# Patient Record
Sex: FEMALE | Race: BLACK OR AFRICAN AMERICAN | NOT HISPANIC OR LATINO | Employment: UNEMPLOYED | URBAN - METROPOLITAN AREA
[De-identification: names, ages, dates, MRNs, and addresses within clinical notes are randomized per-mention and may not be internally consistent; named-entity substitution may affect disease eponyms.]

---

## 2023-03-28 ENCOUNTER — HOSPITAL ENCOUNTER (OUTPATIENT)
Facility: HOSPITAL | Age: 43
Discharge: HOME | End: 2023-03-28
Attending: OBSTETRICS & GYNECOLOGY | Admitting: OBSTETRICS & GYNECOLOGY
Payer: MEDICAID

## 2023-03-28 VITALS
HEART RATE: 79 BPM | SYSTOLIC BLOOD PRESSURE: 110 MMHG | RESPIRATION RATE: 16 BRPM | OXYGEN SATURATION: 99 % | DIASTOLIC BLOOD PRESSURE: 76 MMHG | TEMPERATURE: 104 F

## 2023-03-28 PROBLEM — Z34.90 PREGNANCY: Status: ACTIVE | Noted: 2023-03-28

## 2023-03-28 LAB
ALBUMIN SERPL-MCNC: 3.3 G/DL (ref 3.5–5.2)
ALBUMIN/GLOB SERPL: 1.1 G/DL
ALP SERPL-CCNC: 66 U/L (ref 39–117)
ALT SERPL W P-5'-P-CCNC: 12 U/L (ref 1–33)
ANION GAP SERPL CALCULATED.3IONS-SCNC: 10 MMOL/L (ref 5–15)
AST SERPL-CCNC: 18 U/L (ref 1–32)
BACTERIA UR QL AUTO: ABNORMAL /HPF
BILIRUB SERPL-MCNC: 0.2 MG/DL (ref 0–1.2)
BILIRUB UR QL STRIP: NEGATIVE
BUN SERPL-MCNC: 8 MG/DL (ref 6–20)
BUN/CREAT SERPL: 15.4 (ref 7–25)
CALCIUM SPEC-SCNC: 9.1 MG/DL (ref 8.6–10.5)
CHLORIDE SERPL-SCNC: 104 MMOL/L (ref 98–107)
CLARITY UR: ABNORMAL
CO2 SERPL-SCNC: 21 MMOL/L (ref 22–29)
COLOR UR: YELLOW
CREAT SERPL-MCNC: 0.52 MG/DL (ref 0.57–1)
DEPRECATED RDW RBC AUTO: 39.3 FL (ref 37–54)
EGFRCR SERPLBLD CKD-EPI 2021: 119.9 ML/MIN/1.73
ERYTHROCYTE [DISTWIDTH] IN BLOOD BY AUTOMATED COUNT: 12.3 % (ref 12.3–15.4)
GLOBULIN UR ELPH-MCNC: 3.1 GM/DL
GLUCOSE SERPL-MCNC: 82 MG/DL (ref 65–99)
GLUCOSE UR STRIP-MCNC: NEGATIVE MG/DL
HCT VFR BLD AUTO: 32.5 % (ref 34–46.6)
HGB BLD-MCNC: 10.5 G/DL (ref 12–15.9)
HGB UR QL STRIP.AUTO: NEGATIVE
HYALINE CASTS UR QL AUTO: ABNORMAL /LPF
KETONES UR QL STRIP: NEGATIVE
LEUKOCYTE ESTERASE UR QL STRIP.AUTO: ABNORMAL
LIPASE SERPL-CCNC: 39 U/L (ref 13–60)
MCH RBC QN AUTO: 27.9 PG (ref 26.6–33)
MCHC RBC AUTO-ENTMCNC: 32.3 G/DL (ref 31.5–35.7)
MCV RBC AUTO: 86.2 FL (ref 79–97)
NITRITE UR QL STRIP: NEGATIVE
PH UR STRIP.AUTO: 6.5 [PH] (ref 5–8)
PLATELET # BLD AUTO: 332 10*3/MM3 (ref 140–450)
PMV BLD AUTO: 9.6 FL (ref 6–12)
POTASSIUM SERPL-SCNC: 3.8 MMOL/L (ref 3.5–5.2)
PROT SERPL-MCNC: 6.4 G/DL (ref 6–8.5)
PROT UR QL STRIP: ABNORMAL
RBC # BLD AUTO: 3.77 10*6/MM3 (ref 3.77–5.28)
RBC # UR STRIP: ABNORMAL /HPF
REF LAB TEST METHOD: ABNORMAL
SODIUM SERPL-SCNC: 135 MMOL/L (ref 136–145)
SP GR UR STRIP: 1.02 (ref 1–1.03)
SQUAMOUS #/AREA URNS HPF: ABNORMAL /HPF
UROBILINOGEN UR QL STRIP: ABNORMAL
WBC # UR STRIP: ABNORMAL /HPF
WBC NRBC COR # BLD: 10.94 10*3/MM3 (ref 3.4–10.8)

## 2023-03-28 PROCEDURE — 81001 URINALYSIS AUTO W/SCOPE: CPT | Performed by: OBSTETRICS & GYNECOLOGY

## 2023-03-28 PROCEDURE — 87147 CULTURE TYPE IMMUNOLOGIC: CPT | Performed by: OBSTETRICS & GYNECOLOGY

## 2023-03-28 PROCEDURE — G0463 HOSPITAL OUTPT CLINIC VISIT: HCPCS

## 2023-03-28 PROCEDURE — 83690 ASSAY OF LIPASE: CPT | Performed by: OBSTETRICS & GYNECOLOGY

## 2023-03-28 PROCEDURE — 85027 COMPLETE CBC AUTOMATED: CPT | Performed by: OBSTETRICS & GYNECOLOGY

## 2023-03-28 PROCEDURE — 87186 SC STD MICRODIL/AGAR DIL: CPT | Performed by: OBSTETRICS & GYNECOLOGY

## 2023-03-28 PROCEDURE — 96374 THER/PROPH/DIAG INJ IV PUSH: CPT

## 2023-03-28 PROCEDURE — 80053 COMPREHEN METABOLIC PANEL: CPT | Performed by: OBSTETRICS & GYNECOLOGY

## 2023-03-28 PROCEDURE — 59025 FETAL NON-STRESS TEST: CPT

## 2023-03-28 PROCEDURE — 25010000002 ONDANSETRON PER 1 MG: Performed by: OBSTETRICS & GYNECOLOGY

## 2023-03-28 PROCEDURE — 87086 URINE CULTURE/COLONY COUNT: CPT | Performed by: OBSTETRICS & GYNECOLOGY

## 2023-03-28 PROCEDURE — G0378 HOSPITAL OBSERVATION PER HR: HCPCS

## 2023-03-28 PROCEDURE — 96361 HYDRATE IV INFUSION ADD-ON: CPT

## 2023-03-28 RX ORDER — PANTOPRAZOLE SODIUM 40 MG/10ML
40 INJECTION, POWDER, LYOPHILIZED, FOR SOLUTION INTRAVENOUS
Status: DISCONTINUED | OUTPATIENT
Start: 2023-03-29 | End: 2023-03-28 | Stop reason: HOSPADM

## 2023-03-28 RX ORDER — SODIUM CHLORIDE 9 MG/ML
40 INJECTION, SOLUTION INTRAVENOUS AS NEEDED
Status: DISCONTINUED | OUTPATIENT
Start: 2023-03-28 | End: 2023-03-28 | Stop reason: HOSPADM

## 2023-03-28 RX ORDER — PNV NO.95/FERROUS FUM/FOLIC AC 28MG-0.8MG
1 TABLET ORAL DAILY
Qty: 100 TABLET | Refills: 3 | Status: SHIPPED | OUTPATIENT
Start: 2023-03-28

## 2023-03-28 RX ORDER — ONDANSETRON 4 MG/1
4 TABLET, ORALLY DISINTEGRATING ORAL EVERY 8 HOURS PRN
Qty: 10 TABLET | Refills: 0 | Status: SHIPPED | OUTPATIENT
Start: 2023-03-28

## 2023-03-28 RX ORDER — SODIUM CHLORIDE 0.9 % (FLUSH) 0.9 %
10 SYRINGE (ML) INJECTION EVERY 12 HOURS SCHEDULED
Status: DISCONTINUED | OUTPATIENT
Start: 2023-03-28 | End: 2023-03-28 | Stop reason: HOSPADM

## 2023-03-28 RX ORDER — SODIUM CHLORIDE 0.9 % (FLUSH) 0.9 %
1-10 SYRINGE (ML) INJECTION AS NEEDED
Status: DISCONTINUED | OUTPATIENT
Start: 2023-03-28 | End: 2023-03-28 | Stop reason: HOSPADM

## 2023-03-28 RX ORDER — ONDANSETRON 2 MG/ML
4 INJECTION INTRAMUSCULAR; INTRAVENOUS EVERY 6 HOURS PRN
Status: DISCONTINUED | OUTPATIENT
Start: 2023-03-28 | End: 2023-03-28 | Stop reason: HOSPADM

## 2023-03-28 RX ADMIN — ASCORBIC ACID, VITAMIN A PALMITATE, CHOLECALCIFEROL, THIAMINE HYDROCHLORIDE, RIBOFLAVIN-5 PHOSPHATE SODIUM, PYRIDOXINE HYDROCHLORIDE, NIACINAMIDE, DEXPANTHENOL, ALPHA-TOCOPHEROL ACETATE, VITAMIN K1, FOLIC ACID, BIOTIN, CYANOCOBALAMIN: 200; 3300; 200; 6; 3.6; 6; 40; 15; 10; 150; 600; 60; 5 INJECTION, SOLUTION INTRAVENOUS at 18:50

## 2023-03-28 RX ADMIN — SODIUM CHLORIDE, POTASSIUM CHLORIDE, SODIUM LACTATE AND CALCIUM CHLORIDE 1000 ML: 600; 310; 30; 20 INJECTION, SOLUTION INTRAVENOUS at 17:30

## 2023-03-28 RX ADMIN — ONDANSETRON 4 MG: 2 INJECTION INTRAMUSCULAR; INTRAVENOUS at 17:58

## 2023-03-28 NOTE — PROGRESS NOTES
Laborist    Patient seen and examined.  Patient tolerated clear liquid diet has had no further nausea vomiting since admission.  Vital signs stable afebrile  Fetal heart rate reactive    CMP essentially within normal limits, lipase 39, CBC WBC 10.9 H&H 10 and 25.5    IMP 1.  IUP  28 weeks 5 days just         2.  Nausea and vomiting resolved         3.  Dehydration corrected  PLAN        1.  Discharged home, Rx Zofran as needed, daily multivitamin, follow-up with [facility] until care established at [facility].  All questions answered through  patient agreed.

## 2023-03-28 NOTE — H&P
[facility]  Obstetric History and Physical    Referring Provider: Evette Belcher MD      CC: Nausea and vomiting.    Subjective     Patient is a 43 y.o. female  currently at 28w5d, who presents with nausea and vomiting.  Patient reports nausea and vomiting for 3 days.  Patient denies fever, leaking of fluid, vaginal bleeding, recent trauma, regular urine activity, or decreased fetal movement.  Patient is of  descent and speaks Swahili.   care at [facility] clinic.  Records reviewed by  resident.  And conveyed.  Normal anomaly scan and normal NIPS.   course complicated by recurrent nausea and vomiting.  She reports has scheduled appointment with one of her [facility] physicians April 15.  Obstetrical history significant for 5 term vaginal deliveries in the [facility] without complications per patient.  All history and information was obtained through online .     .    The following portions of the patients history were reviewed and updated as appropriate: current medications, allergies, past medical history, past surgical history, past family history, past social history and problem list .       Prenatal Information:   Maternal Prenatal Labs  Blood Type No results found for: ABO   Rh Status No results found for: RH   Antibody Screen No results found for: ABSCRN   Gonnorhea No results found for: GCCX   Chlamydia No results found for: CLAMYDCU   RPR No results found for: RPR   Syphilis Antibody No results found for: SYPHILIS   Rubella No results found for: RUBELLAIGGIN   Hepatitis B Surface Antigen No results found for: HEPBSAG   HIV-1 Antibody No results found for: LABHIV1   Hepatitis C Antibody No results found for: HEPCAB   Rapid Urin Drug Screen No results found for: AMPMETHU, BARBITSCNUR, LABBENZSCN, LABMETHSCN, LABOPIASCN, THCURSCR, COCAINEUR, AMPHETSCREEN, PROPOXSCN, BUPRENORSCNU, METAMPSCNUR, OXYCODONESCN, TRICYCLICSCN   Group B Strep  Culture No results found for: GBSANTIGEN           External Prenatal Results     Pregnancy Outside Results - Transcribed From Office Records - See Scanned Records For Details     Test Value Date Time    ABO       Rh       Antibody Screen       Varicella IgG       Rubella       Hgb ^ 12.6 g/dL 22 1113      ^ 13.5 g/dL 22 0937    Hct ^ 37.9 % 22 1113      ^ 39.7 % 22 0937    Glucose Fasting GTT       Glucose Tolerance Test 1 hour       Glucose Tolerance Test 3 hour       Gonorrhea (discrete)       Chlamydia (discrete)       RPR       VDRL       Syphilis Antibody       HBsAg ^ Negative  22 1113    Herpes Simplex Virus PCR       Herpes Simplex VIrus Culture       HIV ^ Nonreactive  22 1113    Hep C RNA Quant PCR       Hep C Antibody       AFP       Group B Strep       GBS Susceptibility to Clindamycin       GBS Susceptibility to Erythromycin       Fetal Fibronectin       Genetic Testing, Maternal Blood             Drug Screening     Test Value Date Time    Urine Drug Screen       Amphetamine Screen       Barbiturate Screen       Benzodiazepine Screen       Methadone Screen       Phencyclidine Screen       Opiates Screen       THC Screen       Cocaine Screen       Propoxyphene Screen       Buprenorphine Screen ^ <10 ng/mL 22 1012      ^ <50 ng/mL 22 1012    Methamphetamine Screen       Oxycodone Screen       Tricyclic Antidepressants Screen             Legend    ^: Historical                          Past OB History:       OB History    Para Term  AB Living   6 5 5 0 0 5   SAB IAB Ectopic Molar Multiple Live Births   0 0 0 0 0 5      # Outcome Date GA Lbr Akbar/2nd Weight Sex Delivery Anes PTL Lv   6 Current            5 Term 02/15/16    F Vag-Spont   LUANN   4 Term 11    F Vag-Spont   LUANN   3 Term 10/06/08    M Vag-Spont   LUANN   2 Term 06    M Vag-Spont   LUANN   1 Term 03    M Vag-Spont   LUANN       Past Medical History: History reviewed. No  pertinent past medical history.   Past Surgical History No past surgical history on file.   Family History: No family history on file.   Social History:  reports that she has never smoked. She does not have any smokeless tobacco history on file.   reports no history of alcohol use.   reports no history of drug use.                   General ROS Negative Findings:Headaches, Visual Changes, Epigastric pain, Anorexia, ROM and Vaginal Bleeding    Review of Systems   Constitutional: Negative for fever and night sweats.   Respiratory: Negative for shortness of breath.    Gastrointestinal: Positive for nausea and vomiting. Negative for abdominal pain.   Positive epigastric pain     All other systems have been reviewed and are neg  Objective       Vital Signs Range for the last 24 hours  Temperature: Temp:  [97.2 °F (36.2 °C)-98.3 °F (36.8 °C)] 98.3 °F (36.8 °C)   Temp Source: Temp src: Oral   BP: BP: (92)/(54) 92/54   Pulse: Heart Rate:  [79-88] 79   Respirations: Resp:  [16] 16   SPO2: SpO2:  [98 %-99 %] 99 %   O2 Amount (l/min):     O2 Devices     Weight:       Physical Examination:   General:   alert, appears stated age and cooperative   Skin:   normal   HEENT:  Sclera clear   Lungs:   clear to auscultation bilaterally   Heart:   regular rate and rhythm, S1, S2 normal, no murmur, click, rub or gallop   Gastrointestinal:  Abdomen soft, gravid uterus, guarding benign exam   Lower Extremities:  Edema, no calf tenderness   : Exam deferred.   Musculoskeletal:     Neuro:  No focal deficits noted               Fetal Heart Rate Assessment   Method:     Beats/min:     Baseline:     Varibility:     Accels:     Decels:     Tracing Category:     NST-indications nausea and vomiting, interpretation reactive, moderate variability, accelerations present 15 x 15, no fetal deceleration noted          onset 1645, end time 1735, rare contractions noted  Uterine Assessment   Method:     Frequency (min):     Ctx Count in 10 min:      Duration:     Intensity:     Intensity by IUPC:     Resting Tone:     Resting Tone by IUPC:     [place] Units:       Laboratory Results:   Lab Results (last 24 hours)     ** No results found for the last 24 hours. **        Radiology Review:   Imaging Results (Last 24 Hours)     ** No results found for the last 24 hours. **        Other Studies:    Assessment & Plan       Pregnancy        Assessment:  1.  Intrauterine pregnancy at 28w5d weeks gestation with reactive fetal status.    2.  Nausea and vomiting  3.  Dehydration secondary to above  4.  AMA      Plan:  1.  Observation, IV hydration, labs, antiemetics, and urinalysis.  2. Plan of care has been reviewed with patient.  3.  Risks, benefits of treatment plan have been discussed.  4.  All questions have been answered.  5      Adilson Link DO  3/28/2023  17:33 EDT

## 2023-03-31 LAB — BACTERIA SPEC AEROBE CULT: ABNORMAL

## 2023-04-19 ENCOUNTER — TRANSCRIBE ORDERS (OUTPATIENT)
Dept: LAB | Facility: HOSPITAL | Age: 43
End: 2023-04-19
Payer: MEDICAID

## 2023-04-19 ENCOUNTER — LAB (OUTPATIENT)
Dept: LAB | Facility: HOSPITAL | Age: 43
End: 2023-04-19
Payer: MEDICAID

## 2023-04-19 DIAGNOSIS — Z34.03 ENCOUNTER FOR SUPERVISION OF NORMAL FIRST PREGNANCY IN THIRD TRIMESTER: ICD-10-CM

## 2023-04-19 DIAGNOSIS — Z3A.31 31 WEEKS GESTATION OF PREGNANCY: Primary | ICD-10-CM

## 2023-04-19 DIAGNOSIS — Z3A.31 31 WEEKS GESTATION OF PREGNANCY: ICD-10-CM

## 2023-04-19 LAB
ABO GROUP BLD: NORMAL
AMPHET+METHAMPHET UR QL: NEGATIVE
AMPHETAMINES UR QL: NEGATIVE
BARBITURATES UR QL SCN: NEGATIVE
BENZODIAZ UR QL SCN: NEGATIVE
BLD GP AB SCN SERPL QL: NEGATIVE
BUPRENORPHINE SERPL-MCNC: NEGATIVE NG/ML
CANNABINOIDS SERPL QL: NEGATIVE
COCAINE UR QL: NEGATIVE
DEPRECATED RDW RBC AUTO: 37.4 FL (ref 37–54)
ERYTHROCYTE [DISTWIDTH] IN BLOOD BY AUTOMATED COUNT: 12.6 % (ref 12.3–15.4)
HBA1C MFR BLD: 5.5 % (ref 4.8–5.6)
HBV SURFACE AG SERPL QL IA: NORMAL
HCT VFR BLD AUTO: 33.8 % (ref 34–46.6)
HGB BLD-MCNC: 11.4 G/DL (ref 12–15.9)
MCH RBC QN AUTO: 27.7 PG (ref 26.6–33)
MCHC RBC AUTO-ENTMCNC: 33.7 G/DL (ref 31.5–35.7)
MCV RBC AUTO: 82.2 FL (ref 79–97)
METHADONE UR QL SCN: NEGATIVE
OPIATES UR QL: NEGATIVE
OXYCODONE UR QL SCN: NEGATIVE
PCP UR QL SCN: NEGATIVE
PLATELET # BLD AUTO: 348 10*3/MM3 (ref 140–450)
PMV BLD AUTO: 10.3 FL (ref 6–12)
PROPOXYPH UR QL: NEGATIVE
RBC # BLD AUTO: 4.11 10*6/MM3 (ref 3.77–5.28)
RH BLD: POSITIVE
TRICYCLICS UR QL SCN: NEGATIVE
WBC NRBC COR # BLD: 10.02 10*3/MM3 (ref 3.4–10.8)

## 2023-04-19 PROCEDURE — 86901 BLOOD TYPING SEROLOGIC RH(D): CPT

## 2023-04-19 PROCEDURE — 86780 TREPONEMA PALLIDUM: CPT

## 2023-04-19 PROCEDURE — 87186 SC STD MICRODIL/AGAR DIL: CPT

## 2023-04-19 PROCEDURE — 80306 DRUG TEST PRSMV INSTRMNT: CPT

## 2023-04-19 PROCEDURE — 87340 HEPATITIS B SURFACE AG IA: CPT

## 2023-04-19 PROCEDURE — 85027 COMPLETE CBC AUTOMATED: CPT

## 2023-04-19 PROCEDURE — 86787 VARICELLA-ZOSTER ANTIBODY: CPT

## 2023-04-19 PROCEDURE — 36415 COLL VENOUS BLD VENIPUNCTURE: CPT

## 2023-04-19 PROCEDURE — 87077 CULTURE AEROBIC IDENTIFY: CPT

## 2023-04-19 PROCEDURE — 86900 BLOOD TYPING SEROLOGIC ABO: CPT

## 2023-04-19 PROCEDURE — 86762 RUBELLA ANTIBODY: CPT

## 2023-04-19 PROCEDURE — 87086 URINE CULTURE/COLONY COUNT: CPT

## 2023-04-19 PROCEDURE — 86803 HEPATITIS C AB TEST: CPT

## 2023-04-19 PROCEDURE — 87591 N.GONORRHOEAE DNA AMP PROB: CPT

## 2023-04-19 PROCEDURE — 87147 CULTURE TYPE IMMUNOLOGIC: CPT

## 2023-04-19 PROCEDURE — G0432 EIA HIV-1/HIV-2 SCREEN: HCPCS

## 2023-04-19 PROCEDURE — 83036 HEMOGLOBIN GLYCOSYLATED A1C: CPT

## 2023-04-19 PROCEDURE — 87491 CHLMYD TRACH DNA AMP PROBE: CPT

## 2023-04-19 PROCEDURE — 86850 RBC ANTIBODY SCREEN: CPT

## 2023-04-19 PROCEDURE — 81001 URINALYSIS AUTO W/SCOPE: CPT

## 2023-04-20 LAB
BACTERIA UR QL AUTO: ABNORMAL /HPF
BILIRUB UR QL STRIP: NEGATIVE
CLARITY UR: ABNORMAL
COD CRY URNS QL: ABNORMAL /HPF
COLOR UR: YELLOW
GLUCOSE UR STRIP-MCNC: NEGATIVE MG/DL
HCV AB SER DONR QL: NORMAL
HGB UR QL STRIP.AUTO: NEGATIVE
HIV1+2 AB SER QL: NORMAL
HYALINE CASTS UR QL AUTO: ABNORMAL /LPF
KETONES UR QL STRIP: NEGATIVE
LEUKOCYTE ESTERASE UR QL STRIP.AUTO: ABNORMAL
NITRITE UR QL STRIP: NEGATIVE
PH UR STRIP.AUTO: 6.5 [PH] (ref 5–8)
PROT UR QL STRIP: ABNORMAL
RBC # UR STRIP: ABNORMAL /HPF
REF LAB TEST METHOD: ABNORMAL
SP GR UR STRIP: 1.02 (ref 1–1.03)
SQUAMOUS #/AREA URNS HPF: ABNORMAL /HPF
UROBILINOGEN UR QL STRIP: ABNORMAL
WBC # UR STRIP: ABNORMAL /HPF

## 2023-04-21 LAB
RUBV IGG SERPL IA-ACNC: 2.05 INDEX
VZV IGG SER IA-ACNC: 291 INDEX

## 2023-04-22 LAB — BACTERIA SPEC AEROBE CULT: ABNORMAL

## 2023-04-24 LAB
C TRACH RRNA SPEC QL NAA+PROBE: NEGATIVE
N GONORRHOEA RRNA SPEC QL NAA+PROBE: NEGATIVE
TREPONEMA PALLIDUM IGG+IGM AB [PRESENCE] IN SERUM OR PLASMA BY IMMUNOASSAY: NON REACTIVE

## 2023-06-11 ENCOUNTER — HOSPITAL ENCOUNTER (OUTPATIENT)
Facility: HOSPITAL | Age: 43
Discharge: HOME | End: 2023-06-11
Attending: OBSTETRICS & GYNECOLOGY | Admitting: OBSTETRICS & GYNECOLOGY
Payer: MEDICAID

## 2023-06-11 VITALS — SYSTOLIC BLOOD PRESSURE: 112 MMHG | HEART RATE: 81 BPM | DIASTOLIC BLOOD PRESSURE: 72 MMHG

## 2023-06-11 PROCEDURE — 59025 FETAL NON-STRESS TEST: CPT | Performed by: OBSTETRICS & GYNECOLOGY

## 2023-06-11 PROCEDURE — 99213 OFFICE O/P EST LOW 20 MIN: CPT | Performed by: OBSTETRICS & GYNECOLOGY

## 2023-06-11 PROCEDURE — G0463 HOSPITAL OUTPT CLINIC VISIT: HCPCS

## 2023-06-11 PROCEDURE — 59025 FETAL NON-STRESS TEST: CPT

## 2023-06-11 NOTE — H&P
[facility]  Obstetric History and Physical    Chief complaint: Contractions    Subjective     Patient is a 43 y.o. female  currently at 39w3d, who presents with complaints of contractions.  She denies vaginal bleeding or leakage of fluid.    Her prenatal care is reportedly notable only for advanced maternal age. Her previous obstetric/gynecological history is notable for 5 prior vaginal deliveries all performed in [place].    The following portions of the patients history were reviewed and updated as appropriate: current medications, allergies, past medical history, past surgical history, past family history, past social history, and problem list .        Prenatal Information:   Maternal Prenatal Labs  Blood Type No results found for: ABO   Rh Status No results found for: RH   Antibody Screen No results found for: ABSCRN   Gonnorhea No results found for: GCCX   Chlamydia No results found for: CLAMYDCU   RPR No results found for: RPR   Syphilis Antibody No results found for: SYPHILIS   Rubella No results found for: RUBELLAIGGIN   Hepatitis B Surface Antigen No results found for: HEPBSAG   HIV-1 Antibody No results found for: LABHIV1   Hepatitis C Antibody No results found for: HEPCAB   Rapid Urin Drug Screen No results found for: AMPMETHU, BARBITSCNUR, LABBENZSCN, LABMETHSCN, LABOPIASCN, THCURSCR, COCAINEUR, AMPHETSCREEN, PROPOXSCN, BUPRENORSCNU, METAMPSCNUR, OXYCODONESCN, TRICYCLICSCN   Group B Strep Culture No results found for: GBSANTIGEN           External Prenatal Results       Pregnancy Outside Results - Transcribed From Office Records - See Scanned Records For Details       Test Value Date Time    ABO  O  23 1737    Rh  Positive  23 1737    Antibody Screen  Negative  23 1737    Varicella IgG  291 index 23 1737    Rubella  2.05 index 23 1737    Hgb  11.4 g/dL 23 1737       10.5 g/dL 23 1745      ^ 12.6 g/dL 22 1113      ^ 13.5 g/dL 22 0937    Hct   33.8 % 23 1737       32.5 % 23 1745      ^ 37.9 % 22 1113      ^ 39.7 % 22 0937    Glucose Fasting GTT       Glucose Tolerance Test 1 hour       Glucose Tolerance Test 3 hour       Gonorrhea (discrete)  Negative  23 1737    Chlamydia (discrete)  Negative  23 1737    RPR       VDRL       Syphilis Antibody       HBsAg  Non-Reactive  23 1737      ^ Negative  22 1113    Herpes Simplex Virus PCR       Herpes Simplex VIrus Culture       HIV  Non-Reactive  23 1737      ^ Nonreactive  22 1113    Hep C RNA Quant PCR       Hep C Antibody  Non-Reactive  23 1737    AFP       Group B Strep       GBS Susceptibility to Clindamycin       GBS Susceptibility to Erythromycin       Fetal Fibronectin       Genetic Testing, Maternal Blood                 Drug Screening       Test Value Date Time    Urine Drug Screen       Amphetamine Screen  Negative  23 1737    Barbiturate Screen  Negative  23 1737    Benzodiazepine Screen  Negative  23 1737    Methadone Screen  Negative  23 1737    Phencyclidine Screen  Negative  23 1737    Opiates Screen  Negative  23 1737    THC Screen  Negative  23 1737    Cocaine Screen       Propoxyphene Screen  Negative  23 1737    Buprenorphine Screen  Negative  23 1737      ^ <10 ng/mL 22 1012      ^ <50 ng/mL 22 1012    Methamphetamine Screen       Oxycodone Screen  Negative  23 1737    Tricyclic Antidepressants Screen  Negative  23 1737              Legend    ^: Historical                              Past OB History:       OB History    Para Term  AB Living   6 5 5 0 0 5   SAB IAB Ectopic Molar Multiple Live Births   0 0 0 0 0 5      # Outcome Date GA Lbr Akbar/2nd Weight Sex Delivery Anes PTL Lv   6 Current            5 Term 02/15/16    F Vag-Spont   LUANN   4 Term 11    F Vag-Spont   LUANN   3 Term 10/06/08    M Vag-Spont   LUANN   2 Term 06    M  Vag-Spont   LUANN   1 Term 08/11/03    M Vag-Spont   LUANN       Past Medical History:  History reviewed. No pertinent past medical history.   Past Surgical History No past surgical history on file.   Family History: History reviewed. No pertinent family history.   Social History:  reports that she has never smoked. She does not have any smokeless tobacco history on file.   reports no history of alcohol use.   reports no history of drug use.   Allergies: Patient has no known allergies.  Current Medications:          No current facility-administered medications on file prior to encounter.     Current Outpatient Medications on File Prior to Encounter   Medication Sig Dispense Refill    ondansetron ODT (ZOFRAN-ODT) 4 MG disintegrating tablet Place 1 tablet on the tongue Every 8 (Eight) Hours As Needed for Nausea or Vomiting. 10 tablet 0    Prenatal Vit-Fe Fumarate-FA (Prenatal Vitamin) 27-0.8 MG tablet Take 1 tablet by mouth Daily. 100 tablet 3       General ROS: Pertinent items are noted in HPI, all other systems reviewed and negative    Objective       Vital Signs Range for the last 24 hours  Temperature:     Temp Source:     BP: BP: (112)/(72) 112/72   Pulse: Heart Rate:  [81] 81   Respirations:     SPO2:     O2 Amount (l/min):     O2 Devices     Weight:       Physical Examination: General appearance - alert, well appearing, and in no distress, oriented to person, place, and time, and normal appearing weight  Mental status - alert, oriented to person, place, and time, normal mood, behavior, speech, dress, motor activity, and thought processes  Eyes - pupils equal and reactive, extraocular eye movements intact, sclera anicteric  Neck - supple, no significant adenopathy, thyroid exam: thyroid is normal in size without nodules or tenderness  Chest - clear to auscultation, no wheezes, rales or rhonchi, symmetric air entry  Heart - normal rate, regular rhythm, normal S1, S2, no murmurs, rubs, clicks or gallops  Abdomen-soft,  nontender, nondistended, no masses or organomegaly   Abdomen, Non-Tender  Pelvic -serial pelvic exam performed by nursing staff confirms cervix to remain 3 cm / 50%/-3 station (cervix remains very posterior).  Neurological - alert, oriented, normal speech, no focal findings or movement disorder noted, DTR's normal and symmetric  Extremities - no pedal edema noted    Fetal Heart Rate Assessment  Indication: False labor   Start Time: 0037                end Time: 0208   NST Results: Reactive NST.  Fetal heart rate baseline 130-140 bpm.  Average variability with 15 x 15 accelerations noted.  No decelerations.  Contractions noted every 6-8 minutes.        Laboratory Results:   Lab Results   Component Value Date    ALKPHOS 66 2023    ALT 12 2023    AST 18 2023    CREATININE 0.52 (L) 2023    BILITOT 0.2 2023       No results found for: WBC, RBC, HGB, HCT, MCV, MCH, MCHC, RDW, RDWSD, MPV, PLT, NEUTRORELPCT, LYMPHORELPCT, MONORELPCT, EOSRELPCT, BASORELPCT, AUTOIGPER, NEUTROABS, LYMPHSABS, MONOSABS, EOSABS, BASOSABS, AUTOIGNUM, NRBC          Brief Urine Lab Results  (Last result in the past 365 days)        Color   Clarity   Blood   Leuk Est   Nitrite   Protein   CREAT   Urine HCG        23 1737 Yellow   Cloudy   Negative   Small (1+)   Negative   Trace                     Radiology Review: No new studies  Other Studies: None    Assessment & Plan       * No active hospital problems. *        Assessment:  False labor.  Patient request to be discharged home as no progression in cervical dilation noted.    Plan:  Discharge home.  Reviewed signs/symptoms of labor and patient reminded regarding fetal kick counts.  Keep regularly scheduled OB office visit.     Total time spent today with Karin  was 20-29 minutes (level 3).  Of this time, > 50% was spent face-to-face time coordinating care, answering her questions and counseling regarding pathophysiology of her presenting problem along  "with plans for any diagnostic work-up and treatment.        Joe Plaza \"Shobha\" JAS Silver MD  6/11/2023  02:16 EDT    "

## 2023-06-13 ENCOUNTER — HOSPITAL ENCOUNTER (INPATIENT)
Facility: HOSPITAL | Age: 43
LOS: 2 days | Discharge: HOME | End: 2023-06-15
Attending: OBSTETRICS & GYNECOLOGY | Admitting: OBSTETRICS & GYNECOLOGY
Payer: MEDICAID

## 2023-06-13 PROBLEM — Z37.9 NORMAL LABOR: Status: ACTIVE | Noted: 2023-06-13

## 2023-06-13 PROBLEM — Z37.9 NORMAL LABOR: Status: RESOLVED | Noted: 2023-06-13 | Resolved: 2023-06-13

## 2023-06-13 LAB
ABO GROUP BLD: NORMAL
ALP SERPL-CCNC: 148 U/L (ref 39–117)
ALT SERPL W P-5'-P-CCNC: 9 U/L (ref 1–33)
AST SERPL-CCNC: 21 U/L (ref 1–32)
ATMOSPHERIC PRESS: ABNORMAL MM[HG]
ATMOSPHERIC PRESS: ABNORMAL MM[HG]
BASE EXCESS BLDCOA CALC-SCNC: -6.6 MMOL/L (ref 0–2)
BASE EXCESS BLDCOV CALC-SCNC: -5.9 MMOL/L (ref 0–2)
BDY SITE: ABNORMAL
BDY SITE: ABNORMAL
BILIRUB SERPL-MCNC: 0.5 MG/DL (ref 0–1.2)
BLD GP AB SCN SERPL QL: NEGATIVE
BODY TEMPERATURE: 37 C
BODY TEMPERATURE: 37 C
CO2 BLDA-SCNC: 22.9 MMOL/L (ref 22–33)
CO2 BLDA-SCNC: 24.1 MMOL/L (ref 22–33)
COLLECT TME SMN: ABNORMAL
CREAT SERPL-MCNC: 0.77 MG/DL (ref 0.57–1)
EPAP: 0
EPAP: 0
HCO3 BLDCOA-SCNC: 22.4 MMOL/L (ref 16.9–20.5)
HCO3 BLDCOV-SCNC: 21.4 MMOL/L (ref 18.6–21.4)
HGB BLDA-MCNC: 16.7 G/DL (ref 14–18)
HGB BLDA-MCNC: 17.8 G/DL (ref 14–18)
INHALED O2 CONCENTRATION: 21 %
INHALED O2 CONCENTRATION: 21 %
IPAP: 0
IPAP: 0
LDH SERPL-CCNC: 207 U/L (ref 135–214)
MODALITY: ABNORMAL
MODALITY: ABNORMAL
NOTE: ABNORMAL
NOTE: ABNORMAL
PAW @ PEAK INSP FLOW SETTING VENT: 0 CMH2O
PAW @ PEAK INSP FLOW SETTING VENT: 0 CMH2O
PCO2 BLDCOA: 56.3 MMHG (ref 43.3–54.9)
PCO2 BLDCOV: 47.3 MM HG (ref 28–40)
PH BLDCOA: 7.21 PH UNITS (ref 7.22–7.3)
PH BLDCOV: 7.26 PH UNITS (ref 7.31–7.37)
PO2 BLDCOA: 21.5 MMHG (ref 11.5–43.3)
PO2 BLDCOV: 20.2 MM HG (ref 21–31)
RH BLD: POSITIVE
SAO2 % BLDCOA: 35.3 %
SAO2 % BLDCOA: ABNORMAL %
SAO2 % BLDCOV: 36 %
T&S EXPIRATION DATE: NORMAL
TOTAL RATE: 0 BREATHS/MINUTE
TOTAL RATE: 0 BREATHS/MINUTE
URATE SERPL-MCNC: 3.5 MG/DL (ref 2.4–5.7)
VENTILATOR MODE: ABNORMAL
VENTILATOR MODE: ABNORMAL

## 2023-06-13 PROCEDURE — 86850 RBC ANTIBODY SCREEN: CPT

## 2023-06-13 PROCEDURE — 84450 TRANSFERASE (AST) (SGOT): CPT

## 2023-06-13 PROCEDURE — 86900 BLOOD TYPING SEROLOGIC ABO: CPT

## 2023-06-13 PROCEDURE — 0HQ9XZZ REPAIR PERINEUM SKIN, EXTERNAL APPROACH: ICD-10-PCS

## 2023-06-13 PROCEDURE — 82565 ASSAY OF CREATININE: CPT

## 2023-06-13 PROCEDURE — 83615 LACTATE (LD) (LDH) ENZYME: CPT

## 2023-06-13 PROCEDURE — 82247 BILIRUBIN TOTAL: CPT

## 2023-06-13 PROCEDURE — 84550 ASSAY OF BLOOD/URIC ACID: CPT

## 2023-06-13 PROCEDURE — 86901 BLOOD TYPING SEROLOGIC RH(D): CPT

## 2023-06-13 PROCEDURE — 82805 BLOOD GASES W/O2 SATURATION: CPT

## 2023-06-13 PROCEDURE — 84460 ALANINE AMINO (ALT) (SGPT): CPT

## 2023-06-13 PROCEDURE — 25010000002 TERBUTALINE PER 1 MG

## 2023-06-13 PROCEDURE — 84075 ASSAY ALKALINE PHOSPHATASE: CPT

## 2023-06-13 RX ORDER — PROMETHAZINE HYDROCHLORIDE 12.5 MG/1
12.5 TABLET ORAL EVERY 4 HOURS PRN
Status: DISCONTINUED | OUTPATIENT
Start: 2023-06-13 | End: 2023-06-15 | Stop reason: HOSPADM

## 2023-06-13 RX ORDER — TERBUTALINE SULFATE 1 MG/ML
0.25 INJECTION, SOLUTION SUBCUTANEOUS AS NEEDED
Status: DISCONTINUED | OUTPATIENT
Start: 2023-06-13 | End: 2023-06-13 | Stop reason: HOSPADM

## 2023-06-13 RX ORDER — ONDANSETRON 4 MG/1
4 TABLET, FILM COATED ORAL EVERY 6 HOURS PRN
Status: DISCONTINUED | OUTPATIENT
Start: 2023-06-13 | End: 2023-06-15 | Stop reason: HOSPADM

## 2023-06-13 RX ORDER — LIDOCAINE HYDROCHLORIDE 10 MG/ML
5 INJECTION, SOLUTION EPIDURAL; INFILTRATION; INTRACAUDAL; PERINEURAL AS NEEDED
Status: DISCONTINUED | OUTPATIENT
Start: 2023-06-13 | End: 2023-06-13 | Stop reason: HOSPADM

## 2023-06-13 RX ORDER — ONDANSETRON 2 MG/ML
4 INJECTION INTRAMUSCULAR; INTRAVENOUS EVERY 6 HOURS PRN
Status: DISCONTINUED | OUTPATIENT
Start: 2023-06-13 | End: 2023-06-13 | Stop reason: SDUPTHER

## 2023-06-13 RX ORDER — PRENATAL VIT/IRON FUM/FOLIC AC 27MG-0.8MG
1 TABLET ORAL DAILY
Status: DISCONTINUED | OUTPATIENT
Start: 2023-06-13 | End: 2023-06-15 | Stop reason: HOSPADM

## 2023-06-13 RX ORDER — OXYTOCIN/0.9 % SODIUM CHLORIDE 30/500 ML
250 PLASTIC BAG, INJECTION (ML) INTRAVENOUS CONTINUOUS
Status: ACTIVE | OUTPATIENT
Start: 2023-06-13 | End: 2023-06-13

## 2023-06-13 RX ORDER — ONDANSETRON 4 MG/1
4 TABLET, FILM COATED ORAL EVERY 6 HOURS PRN
Status: DISCONTINUED | OUTPATIENT
Start: 2023-06-13 | End: 2023-06-13 | Stop reason: HOSPADM

## 2023-06-13 RX ORDER — OXYTOCIN/0.9 % SODIUM CHLORIDE 30/500 ML
PLASTIC BAG, INJECTION (ML) INTRAVENOUS
Status: DISCONTINUED
Start: 2023-06-13 | End: 2023-06-15 | Stop reason: HOSPADM

## 2023-06-13 RX ORDER — MAGNESIUM CARB/ALUMINUM HYDROX 105-160MG
30 TABLET,CHEWABLE ORAL ONCE
Status: COMPLETED | OUTPATIENT
Start: 2023-06-13 | End: 2023-06-13

## 2023-06-13 RX ORDER — SIMETHICONE 80 MG
80 TABLET,CHEWABLE ORAL 4 TIMES DAILY PRN
Status: DISCONTINUED | OUTPATIENT
Start: 2023-06-13 | End: 2023-06-15 | Stop reason: HOSPADM

## 2023-06-13 RX ORDER — IBUPROFEN 600 MG/1
600 TABLET ORAL EVERY 6 HOURS PRN
Status: DISCONTINUED | OUTPATIENT
Start: 2023-06-13 | End: 2023-06-13 | Stop reason: HOSPADM

## 2023-06-13 RX ORDER — LIDOCAINE HYDROCHLORIDE 10 MG/ML
INJECTION, SOLUTION INFILTRATION; PERINEURAL
Status: DISCONTINUED
Start: 2023-06-13 | End: 2023-06-13 | Stop reason: WASHOUT

## 2023-06-13 RX ORDER — BISACODYL 10 MG
10 SUPPOSITORY, RECTAL RECTAL DAILY PRN
Status: DISCONTINUED | OUTPATIENT
Start: 2023-06-14 | End: 2023-06-15 | Stop reason: HOSPADM

## 2023-06-13 RX ORDER — SODIUM CHLORIDE 0.9 % (FLUSH) 0.9 %
1-10 SYRINGE (ML) INJECTION AS NEEDED
Status: DISCONTINUED | OUTPATIENT
Start: 2023-06-13 | End: 2023-06-15 | Stop reason: HOSPADM

## 2023-06-13 RX ORDER — ONDANSETRON 2 MG/ML
4 INJECTION INTRAMUSCULAR; INTRAVENOUS EVERY 6 HOURS PRN
Status: DISCONTINUED | OUTPATIENT
Start: 2023-06-13 | End: 2023-06-13 | Stop reason: HOSPADM

## 2023-06-13 RX ORDER — MISOPROSTOL 200 UG/1
800 TABLET ORAL AS NEEDED
Status: DISCONTINUED | OUTPATIENT
Start: 2023-06-13 | End: 2023-06-13 | Stop reason: HOSPADM

## 2023-06-13 RX ORDER — ONDANSETRON 2 MG/ML
4 INJECTION INTRAMUSCULAR; INTRAVENOUS EVERY 6 HOURS PRN
Status: DISCONTINUED | OUTPATIENT
Start: 2023-06-13 | End: 2023-06-15 | Stop reason: HOSPADM

## 2023-06-13 RX ORDER — CARBOPROST TROMETHAMINE 250 UG/ML
250 INJECTION, SOLUTION INTRAMUSCULAR AS NEEDED
Status: DISCONTINUED | OUTPATIENT
Start: 2023-06-13 | End: 2023-06-13 | Stop reason: HOSPADM

## 2023-06-13 RX ORDER — IBUPROFEN 600 MG/1
600 TABLET ORAL EVERY 6 HOURS PRN
Status: DISCONTINUED | OUTPATIENT
Start: 2023-06-13 | End: 2023-06-15 | Stop reason: HOSPADM

## 2023-06-13 RX ORDER — SODIUM CHLORIDE 0.9 % (FLUSH) 0.9 %
10 SYRINGE (ML) INJECTION EVERY 12 HOURS SCHEDULED
Status: DISCONTINUED | OUTPATIENT
Start: 2023-06-13 | End: 2023-06-13 | Stop reason: HOSPADM

## 2023-06-13 RX ORDER — ONDANSETRON 4 MG/1
4 TABLET, FILM COATED ORAL EVERY 6 HOURS PRN
Status: DISCONTINUED | OUTPATIENT
Start: 2023-06-13 | End: 2023-06-13 | Stop reason: SDUPTHER

## 2023-06-13 RX ORDER — OXYTOCIN/0.9 % SODIUM CHLORIDE 30/500 ML
999 PLASTIC BAG, INJECTION (ML) INTRAVENOUS ONCE
Status: COMPLETED | OUTPATIENT
Start: 2023-06-13 | End: 2023-06-13

## 2023-06-13 RX ORDER — SODIUM CHLORIDE 0.9 % (FLUSH) 0.9 %
1-10 SYRINGE (ML) INJECTION AS NEEDED
Status: DISCONTINUED | OUTPATIENT
Start: 2023-06-13 | End: 2023-06-13 | Stop reason: HOSPADM

## 2023-06-13 RX ORDER — SODIUM CHLORIDE, SODIUM LACTATE, POTASSIUM CHLORIDE, CALCIUM CHLORIDE 600; 310; 30; 20 MG/100ML; MG/100ML; MG/100ML; MG/100ML
125 INJECTION, SOLUTION INTRAVENOUS CONTINUOUS
Status: DISCONTINUED | OUTPATIENT
Start: 2023-06-13 | End: 2023-06-13

## 2023-06-13 RX ORDER — DOCUSATE SODIUM 100 MG/1
100 CAPSULE, LIQUID FILLED ORAL 2 TIMES DAILY
Status: DISCONTINUED | OUTPATIENT
Start: 2023-06-13 | End: 2023-06-15 | Stop reason: HOSPADM

## 2023-06-13 RX ORDER — HYDROCORTISONE 25 MG/G
1 CREAM TOPICAL AS NEEDED
Status: DISCONTINUED | OUTPATIENT
Start: 2023-06-13 | End: 2023-06-15 | Stop reason: HOSPADM

## 2023-06-13 RX ORDER — METHYLERGONOVINE MALEATE 0.2 MG/ML
200 INJECTION INTRAVENOUS ONCE AS NEEDED
Status: DISCONTINUED | OUTPATIENT
Start: 2023-06-13 | End: 2023-06-13 | Stop reason: HOSPADM

## 2023-06-13 RX ORDER — ACETAMINOPHEN 325 MG/1
650 TABLET ORAL EVERY 4 HOURS PRN
Status: DISCONTINUED | OUTPATIENT
Start: 2023-06-13 | End: 2023-06-13 | Stop reason: HOSPADM

## 2023-06-13 RX ORDER — ACETAMINOPHEN 325 MG/1
650 TABLET ORAL EVERY 6 HOURS PRN
Status: DISCONTINUED | OUTPATIENT
Start: 2023-06-13 | End: 2023-06-15 | Stop reason: HOSPADM

## 2023-06-13 RX ADMIN — DOCUSATE SODIUM 100 MG: 100 CAPSULE, LIQUID FILLED ORAL at 20:30

## 2023-06-13 RX ADMIN — DOCUSATE SODIUM 100 MG: 100 CAPSULE, LIQUID FILLED ORAL at 09:10

## 2023-06-13 RX ADMIN — ACETAMINOPHEN 650 MG: 325 TABLET ORAL at 18:08

## 2023-06-13 RX ADMIN — SODIUM CHLORIDE, POTASSIUM CHLORIDE, SODIUM LACTATE AND CALCIUM CHLORIDE 125 ML/HR: 600; 310; 30; 20 INJECTION, SOLUTION INTRAVENOUS at 02:05

## 2023-06-13 RX ADMIN — Medication 999 ML/HR: at 05:24

## 2023-06-13 RX ADMIN — MINERAL OIL 30 ML: 1000 SOLUTION ORAL at 04:51

## 2023-06-13 RX ADMIN — WITCH HAZEL 1 PAD: 500 SOLUTION RECTAL; TOPICAL at 09:10

## 2023-06-13 RX ADMIN — ACETAMINOPHEN 650 MG: 325 TABLET ORAL at 23:24

## 2023-06-13 RX ADMIN — ACETAMINOPHEN 650 MG: 325 TABLET ORAL at 11:55

## 2023-06-13 RX ADMIN — TERBUTALINE SULFATE 0.25 MG: 1 INJECTION SUBCUTANEOUS at 04:27

## 2023-06-13 RX ADMIN — IBUPROFEN 600 MG: 600 TABLET ORAL at 08:03

## 2023-06-13 RX ADMIN — SODIUM CHLORIDE, POTASSIUM CHLORIDE, SODIUM LACTATE AND CALCIUM CHLORIDE 1000 ML: 600; 310; 30; 20 INJECTION, SOLUTION INTRAVENOUS at 04:28

## 2023-06-13 RX ADMIN — Medication: at 09:10

## 2023-06-13 RX ADMIN — PRENATAL VITAMINS-IRON FUMARATE 27 MG IRON-FOLIC ACID 0.8 MG TABLET 1 TABLET: at 08:03

## 2023-06-13 RX ADMIN — IBUPROFEN 600 MG: 600 TABLET ORAL at 14:41

## 2023-06-13 RX ADMIN — IBUPROFEN 600 MG: 600 TABLET ORAL at 20:30

## 2023-06-13 RX ADMIN — Medication 1 APPLICATION: at 09:10

## 2023-06-13 NOTE — H&P
[facility]  Obstetric History and Physical    No chief complaint on file.      Subjective     Patient is a 43 y.o. female  currently at 39w5d, who presents with complaints of contractions.  She denies vaginal bleeding or leakage of fluid.     Her prenatal care is notable only for advanced maternal age. Her previous obstetric/gynecological history is notable for 5 prior vaginal deliveries all performed in [place]. Her pelvis is proven to 4 kg.     The following portions of the patients history were reviewed and updated as appropriate: current medications, allergies, past medical history, past surgical history, past family history, past social history, and problem list .       Prenatal Information:   Maternal Prenatal Labs  Blood Type No results found for: ABO   Rh Status No results found for: RH   Antibody Screen No results found for: ABSCRN   Gonnorhea No results found for: GCCX   Chlamydia No results found for: CLAMYDCU   RPR No results found for: RPR   Syphilis Antibody No results found for: SYPHILIS   Rubella No results found for: RUBELLAIGGIN   Hepatitis B Surface Antigen No results found for: HEPBSAG   HIV-1 Antibody No results found for: LABHIV1   Hepatitis C Antibody No results found for: HEPCAB   Rapid Urin Drug Screen No results found for: AMPMETHU, BARBITSCNUR, LABBENZSCN, LABMETHSCN, LABOPIASCN, THCURSCR, COCAINEUR, AMPHETSCREEN, PROPOXSCN, BUPRENORSCNU, METAMPSCNUR, OXYCODONESCN, TRICYCLICSCN   Group B Strep Culture No results found for: GBSANTIGEN                 Past OB History:       OB History    Para Term  AB Living   6 5 5 0 0 5   SAB IAB Ectopic Molar Multiple Live Births   0 0 0 0 0 5      # Outcome Date GA Lbr Akbar/2nd Weight Sex Delivery Anes PTL Lv   6 Current            5 Term 02/15/16    F Vag-Spont   LUANN   4 Term 11    F Vag-Spont   LUANN   3 Term 10/06/08    M Vag-Spont   LUANN   2 Term 06    M Vag-Spont   LUANN   1 Term 03    M Vag-Spont   LUANN       Past  Medical History: No past medical history on file.   Past Surgical History No past surgical history on file.   Family History: No family history on file.   Social History:  reports that she has never smoked. She does not have any smokeless tobacco history on file.   reports no history of alcohol use.   reports no history of drug use.        REVIEW OF SYSTEMS              Reports fetal movement is normal             Denies leakage of amniotic fluid.             Denies vaginal bleeding             She reports Regular contractions,              All other systems reviewed and are negative    Objective       Vital Signs Range for the last 24 hours  Temperature:     Temp Source:     BP:     Pulse:     Respirations:     SPO2:     O2 Amount (l/min):     O2 Devices     Weight:         Presentation: Vertex   Cervix: Exam by:  RN   Dilation:  4   Effacement:  90   Station:  -2     Fetal Heart Rate Assessment   Method:  external   Beats/min:  150   Baseline:  normal   Variability: moderate   Accels: no   Decels: Yes, early, late, variables   Tracing Category:  2       Constitutional:  Well developed, well nourished, no acute distress, well-groomed.   Respiratory:  normal respiratory effort  Cardiovascular:  Normal rate  Gastrointestinal:  Soft, gravid, nontender.  Uterus: Soft, nontender. Fundus appropriate for dates.  Neurologic:  Alert & oriented x 3,  no focal deficits noted.   Psychiatric:  Speech and behavior appropriate.   Extremities: no cyanosis, clubbing or edema, no evidence of DVT.        Labs:  Lab Results   Component Value Date    WBC 10.02 04/19/2023    HGB 11.4 (L) 04/19/2023    HCT 33.8 (L) 04/19/2023    MCV 82.2 04/19/2023     04/19/2023    AST 18 03/28/2023    ALT 12 03/28/2023               Assessment & Plan     Normal labor        Assessment:  1.  Intrauterine pregnancy at 39w5d weeks gestation with reactive fetal status.    2.   term labor  without ROM  3.  Obstetrical history significant for AMA >40,  NIPT declined.   4.  GBS status: not resulted. Low risk, declines history of GBS  5. US 5/17: 5lb 13 oz, 34th percentile    Plan:  1.Expectant management, fluid bolus, position changes. Declines epidural.   2. Plan of care has been reviewed with patient and questions answered.  3.  Risks, benefits of treatment plan have been discussed.  4.  All questions have been answered.        Jannet Gomez CNM  6/13/2023  01:43 EDT

## 2023-06-13 NOTE — PROGRESS NOTES
06/13/23  04:32 EDT  Karin Beaulieu      ASSESSMENTS:     /79 (BP Location: Left arm, Patient Position: Lying)   Pulse 100   Temp 98.5 °F (36.9 °C) (Oral)   Resp 16   Breastfeeding No     Fetal Heart Rate Assessment   Method: Fetal HR Assessment Method: external   Beats/min: Fetal HR (beats/min): 125   Baseline: Fetal HR Baseline: normal range   Varibility: Fetal HR Variability: moderate (amplitude range 6 to 25 bpm)   Accels: Fetal HR Accelerations: greater than/equal to 15 bpm, lasting at least 15 seconds   Decels: Fetal HR Decelerations: late   Tracing Category:       Uterine Assessment   Method: Method: external tocotransducer   Frequency (min): Contraction Frequency (Minutes): 2-4   Ctx Count in 10 min:     Duration:     Intensity:     Intensity by IUPC:     Resting Tone:     Resting Tone by IUPC:     [place] Units:                                Presentation: vertex   Cervix: Exam by: Method: sterile exam per CNM   Dilation: Cervical Dilation (cm): 6   Effacement: Cervical Effacement: 90%   Station: Fetal Station: -2            Lab Results   Component Value Date    WBC 10.02 04/19/2023    HGB 11.4 (L) 04/19/2023    HCT 33.8 (L) 04/19/2023    MCV 82.2 04/19/2023     04/19/2023    URICACID 3.5 06/13/2023    AST 21 06/13/2023    ALT 9 06/13/2023     06/13/2023     Results from last 7 days   Lab Units 06/13/23  0238   ABO TYPING  O   RH TYPING  Positive   ANTIBODY SCREEN  Negative       PLAN: Call received from RN requesting to view FHT. Notes to have decels with ctx. Dr. Silver reviewed FHT and recommended AROM and internal monitors. CNM to bedside. AROM performed. Thick meconium noted. Plan to have DRT at bedside for delivery. Internal monitors placed. Shortly after AROM, decel noted to the 60s. Unresolved by position changes, IV fluid bolus, terbutaline given, SVE performed and confirm no cord prolapse. FHR back up to baseline. Continue freq position changes.     Jannet Gomez,  RADHA  04:32 EDT  06/13/23

## 2023-06-13 NOTE — L&D DELIVERY NOTE
[facility]   Vaginal Delivery Note    Patient Name: Karin Beaulieu  : 1980  MRN: [id number]    Date of Delivery: 2023     Diagnosis     Pre & Post-Delivery:  Intrauterine pregnancy at 39w5d  Labor status: Spontaneous Onset of Labor      (normal spontaneous vaginal delivery)             Problem List    Transfer to Postpartum     Review the Delivery Report for details.     Delivery     Delivery: Vaginal, Spontaneous     YOB: 2021    Time of Birth:  Gestational Age 5:20 AM   39w5d     Anesthesia: None     Delivering clinician: Jannet Gomez CNM   Forceps?   No   Vacuum? No    Shoulder dystocia present: No        Delivery narrative:  Dr. Silver at bedside to evaluate with decels. Able to reduce cervix. Uncomplicated  at 39w5d over a 1st degree laceration. Anterior shoulder delivered with ease. Infant vigorous at delivery so placed on maternal abdomen. Delayed cord clamping x 1 min. Cord doubly clamped and cut. Cord blood, gases, and cord segment obtained. Placenta delivered spontaneously and appeared intact. Laceration hemostatic. No repair needed.         Infant     Findings: male  infant     Infant observations: Weight: 3190 g (7 lb 0.5 oz)   Length: 20.5  in  Observations/Comments:        Apgars: 8  @ 1 minute /    9  @ 5 minutes   Infant Name: Penuweli     Placenta & Cord         Placenta delivered  Spontaneous  at   2023  5:24 AM     Cord: 3 vessels  present.   Nuchal Cord?  no   Cord blood obtained: Yes    Cord gases obtained:  Yes    Cord gas results: Venous:    pH, Cord Venous   Date Value Ref Range Status   2023 7.264 (L) 7.310 - 7.370 pH Units Final       Arterial:    pH, Cord Arterial   Date Value Ref Range Status   2023 7.21 (C) 7.22 - 7.30 pH Units Final        Repair     Episiotomy: None         Lacerations: Yes  Laceration Information  Laceration Repaired?   Perineal: 1st  No    Periurethral:       Labial:       Sulcus:       Vaginal:        Cervical:         Not needed   Estimated Blood Loss:  75 mL       Complications     Meconium fluid, category 2 FHT    Disposition     Mother to Mother Baby/Postpartum  in stable condition currently.  Baby remains with mom  in stable condition currently.    Jannet Gomez CNM  06/13/23  05:42 EDT

## 2023-06-13 NOTE — LACTATION NOTE
Via son as , Mom said baby is nursing well.  She is also giving the baby formula.  She said she would like to have a home pump, and a Spectra pump was provided.  Son and daughter were shown the QR code to use if mom decides she wants to use the pump.  They were encouraged to call for assistance, if needed.

## 2023-06-13 NOTE — PLAN OF CARE
Goal Outcome Evaluation:  Plan of Care Reviewed With: patient, spouse        Progress: improving  Outcome Evaluation: Pt arrived on unit at approximately 0745 following a vaginal delivery. Pt has been intermittently tachycardic with highest HR noted to be 104 bpm. All other vitals WNL. Pt ambulates in room independently. Voids spontaneously. Chief complaint has been abdominal pain. PRN tylenol given X 1 and ibuprofen X 2. Kpad in use. Pt reports improvement in pain with these interventions. Fundus has been U/U, firm, and midline. Scant rubra present.

## 2023-06-13 NOTE — PROGRESS NOTES
[facility]  Vaginal Delivery Progress Note    Subjective     Doing well, pain controlled, lochia less than menses, voiding without difficulty      Objective     Vital Signs Range for the last 24 hours  Temperature: Temp:  [97.8 °F (36.6 °C)-99 °F (37.2 °C)] 99 °F (37.2 °C)   Temp Source: Temp src: Oral   BP: BP: (109-135)/(53-94) 112/62   Pulse: Heart Rate:  [] 96   Respirations: Resp:  [16-18] 16   SPO2:     O2 Amount (l/min):     O2 Devices Device (Oxygen Therapy): room air         Physical Exam:  General:  no acute distresss.  Abdomen: Soft, non-tender, fundus firm  Lochia: about like a normal period,  Perineum: not inspected  Extremities: normal, atraumatic, no cyanosis, and trace edema.       Lab results reviewed:  Yes    Lab Results   Component Value Date    WBC 10.02 2023    HGB 11.4 (L) 2023    HCT 33.8 (L) 2023    MCV 82.2 2023     2023         Assessment & Plan        (normal spontaneous vaginal delivery)      Karin Beaulieu is stable 4 hours  post-partum       Plan:  Continue current care.      Bridget Puckett CNM  2023  08:43 EDT

## 2023-06-14 LAB
BASOPHILS # BLD AUTO: 0.04 10*3/MM3 (ref 0–0.2)
BASOPHILS NFR BLD AUTO: 0.1 % (ref 0–1.5)
DEPRECATED RDW RBC AUTO: 41.3 FL (ref 37–54)
EOSINOPHIL # BLD AUTO: 0.02 10*3/MM3 (ref 0–0.4)
EOSINOPHIL NFR BLD AUTO: 0.1 % (ref 0.3–6.2)
ERYTHROCYTE [DISTWIDTH] IN BLOOD BY AUTOMATED COUNT: 15.1 % (ref 12.3–15.4)
HCT VFR BLD AUTO: 29.8 % (ref 34–46.6)
HGB BLD-MCNC: 9.5 G/DL (ref 12–15.9)
IMM GRANULOCYTES # BLD AUTO: 0.27 10*3/MM3 (ref 0–0.05)
IMM GRANULOCYTES NFR BLD AUTO: 1 % (ref 0–0.5)
LYMPHOCYTES # BLD AUTO: 1.55 10*3/MM3 (ref 0.7–3.1)
LYMPHOCYTES NFR BLD AUTO: 5.6 % (ref 19.6–45.3)
MCH RBC QN AUTO: 24.5 PG (ref 26.6–33)
MCHC RBC AUTO-ENTMCNC: 31.9 G/DL (ref 31.5–35.7)
MCV RBC AUTO: 76.8 FL (ref 79–97)
MONOCYTES # BLD AUTO: 1.19 10*3/MM3 (ref 0.1–0.9)
MONOCYTES NFR BLD AUTO: 4.3 % (ref 5–12)
NEUTROPHILS NFR BLD AUTO: 24.84 10*3/MM3 (ref 1.7–7)
NEUTROPHILS NFR BLD AUTO: 88.9 % (ref 42.7–76)
NRBC BLD AUTO-RTO: 0 /100 WBC (ref 0–0.2)
PLATELET # BLD AUTO: 381 10*3/MM3 (ref 140–450)
PMV BLD AUTO: 10.2 FL (ref 6–12)
RBC # BLD AUTO: 3.88 10*6/MM3 (ref 3.77–5.28)
WBC NRBC COR # BLD: 27.91 10*3/MM3 (ref 3.4–10.8)

## 2023-06-14 PROCEDURE — 85025 COMPLETE CBC W/AUTO DIFF WBC: CPT

## 2023-06-14 RX ADMIN — IBUPROFEN 600 MG: 600 TABLET ORAL at 08:41

## 2023-06-14 RX ADMIN — ACETAMINOPHEN 650 MG: 325 TABLET ORAL at 20:40

## 2023-06-14 RX ADMIN — ACETAMINOPHEN 650 MG: 325 TABLET ORAL at 05:54

## 2023-06-14 RX ADMIN — IBUPROFEN 600 MG: 600 TABLET ORAL at 02:25

## 2023-06-14 RX ADMIN — DOCUSATE SODIUM 100 MG: 100 CAPSULE, LIQUID FILLED ORAL at 20:40

## 2023-06-14 RX ADMIN — IBUPROFEN 600 MG: 600 TABLET ORAL at 17:04

## 2023-06-14 RX ADMIN — PRENATAL VITAMINS-IRON FUMARATE 27 MG IRON-FOLIC ACID 0.8 MG TABLET 1 TABLET: at 08:42

## 2023-06-14 RX ADMIN — DOCUSATE SODIUM 100 MG: 100 CAPSULE, LIQUID FILLED ORAL at 08:42

## 2023-06-14 NOTE — PROGRESS NOTES
[facility]  Vaginal Delivery Progress Note    Subjective     PPD#1 . Feeling well. Tolerating regular diet. Voiding without difficulty. Pain controlled. Decreasing lochia. VSS. Afebrile.       Objective     Vital Signs Range for the last 24 hours  Temperature: Temp:  [98.1 °F (36.7 °C)-99.1 °F (37.3 °C)] 98.8 °F (37.1 °C)   Temp Source: Temp src: Oral   BP: BP: (103-118)/(55-64) 107/57   Pulse: Heart Rate:  [] 75   Respirations: Resp:  [16-18] 16   SPO2:     O2 Amount (l/min):     O2 Devices           Physical Exam:  General:  no acute distresss.  Abdomen: Soft, non-tender, fundus firm  Extremities: normal, atraumatic, no cyanosis, and trace edema.       Lab results reviewed:  Yes    Lab Results   Component Value Date    WBC 27.91 (H) 2023    HGB 9.5 (L) 2023    HCT 29.8 (L) 2023    MCV 76.8 (L) 2023     2023         Assessment & Plan        (normal spontaneous vaginal delivery)      Karin Beaulieu is Day 1  post-partum       Plan:  Continue current care.      Ayana Licona CNM  2023  08:30 EDT

## 2023-06-14 NOTE — LACTATION NOTE
06/14/23 1235   Maternal Information   Date of Referral 06/14/23   Person Making Referral lactation consultant  (fu consult-- present)   Maternal Reason for Referral   (Mom  previous babies)   Infant Reason for Referral   (Mom reports she has no questions or concerns with breastfeeding at this time)   Milk Expression/Equipment   Breast Pump Type double electric, personal  (insurance breast pump was given to mom yesterday)     Initial teaching was done yesterday. And pump given yesterday.

## 2023-06-15 VITALS
SYSTOLIC BLOOD PRESSURE: 101 MMHG | RESPIRATION RATE: 18 BRPM | TEMPERATURE: 98.3 F | HEART RATE: 70 BPM | DIASTOLIC BLOOD PRESSURE: 58 MMHG

## 2023-06-15 PROBLEM — Z34.90 PREGNANCY: Status: RESOLVED | Noted: 2023-03-28 | Resolved: 2023-06-15

## 2023-06-15 LAB
BASOPHILS # BLD AUTO: 0.05 10*3/MM3 (ref 0–0.2)
BASOPHILS NFR BLD AUTO: 0.3 % (ref 0–1.5)
DEPRECATED RDW RBC AUTO: 42 FL (ref 37–54)
EOSINOPHIL # BLD AUTO: 0.25 10*3/MM3 (ref 0–0.4)
EOSINOPHIL NFR BLD AUTO: 1.3 % (ref 0.3–6.2)
ERYTHROCYTE [DISTWIDTH] IN BLOOD BY AUTOMATED COUNT: 15.2 % (ref 12.3–15.4)
HCT VFR BLD AUTO: 30.1 % (ref 34–46.6)
HGB BLD-MCNC: 9.7 G/DL (ref 12–15.9)
IMM GRANULOCYTES # BLD AUTO: 0.13 10*3/MM3 (ref 0–0.05)
IMM GRANULOCYTES NFR BLD AUTO: 0.7 % (ref 0–0.5)
LYMPHOCYTES # BLD AUTO: 3.26 10*3/MM3 (ref 0.7–3.1)
LYMPHOCYTES NFR BLD AUTO: 17 % (ref 19.6–45.3)
MCH RBC QN AUTO: 25.1 PG (ref 26.6–33)
MCHC RBC AUTO-ENTMCNC: 32.2 G/DL (ref 31.5–35.7)
MCV RBC AUTO: 78 FL (ref 79–97)
MONOCYTES # BLD AUTO: 0.96 10*3/MM3 (ref 0.1–0.9)
MONOCYTES NFR BLD AUTO: 5 % (ref 5–12)
NEUTROPHILS NFR BLD AUTO: 14.51 10*3/MM3 (ref 1.7–7)
NEUTROPHILS NFR BLD AUTO: 75.7 % (ref 42.7–76)
NRBC BLD AUTO-RTO: 0 /100 WBC (ref 0–0.2)
PLATELET # BLD AUTO: 382 10*3/MM3 (ref 140–450)
PMV BLD AUTO: 10.2 FL (ref 6–12)
RBC # BLD AUTO: 3.86 10*6/MM3 (ref 3.77–5.28)
WBC NRBC COR # BLD: 19.16 10*3/MM3 (ref 3.4–10.8)

## 2023-06-15 PROCEDURE — 85025 COMPLETE CBC W/AUTO DIFF WBC: CPT | Performed by: ADVANCED PRACTICE MIDWIFE

## 2023-06-15 RX ORDER — FERROUS SULFATE 325(65) MG
325 TABLET ORAL 2 TIMES DAILY WITH MEALS
Qty: 60 TABLET | Refills: 1 | Status: SHIPPED | OUTPATIENT
Start: 2023-06-15

## 2023-06-15 RX ORDER — IBUPROFEN 600 MG/1
600 TABLET ORAL EVERY 6 HOURS PRN
Qty: 60 TABLET | Refills: 1 | Status: SHIPPED | OUTPATIENT
Start: 2023-06-15

## 2023-06-15 RX ORDER — PSEUDOEPHEDRINE HCL 30 MG
100 TABLET ORAL 2 TIMES DAILY PRN
Qty: 60 CAPSULE | Refills: 1 | Status: SHIPPED | OUTPATIENT
Start: 2023-06-15

## 2023-06-15 RX ADMIN — IBUPROFEN 600 MG: 600 TABLET ORAL at 08:19

## 2023-06-15 RX ADMIN — PRENATAL VITAMINS-IRON FUMARATE 27 MG IRON-FOLIC ACID 0.8 MG TABLET 1 TABLET: at 08:19

## 2023-06-15 RX ADMIN — DOCUSATE SODIUM 100 MG: 100 CAPSULE, LIQUID FILLED ORAL at 08:19

## 2023-06-15 RX ADMIN — IBUPROFEN 600 MG: 600 TABLET ORAL at 02:59

## 2023-06-15 NOTE — PROGRESS NOTES
6/15/2023  PPD #2    Subjective   Karin feels well.  Patient describes her lochia as less than menses.  Pain is well controlled  She is breastfeeding and supplementing as needed.      Objective   Temp: Temp:  [98.3 °F (36.8 °C)-99.2 °F (37.3 °C)] 98.3 °F (36.8 °C) Temp src: Oral   BP: BP: (101-107)/(55-66) 101/58        Pulse: Heart Rate:  [] 70  RR: Resp:  [16-18] 18    General:  No acute distress   Abdomen: Fundus firm and beneath umbilicus   Pelvis: deferred     Lab Results   Component Value Date    WBC 19.16 (H) 06/15/2023    HGB 9.7 (L) 06/15/2023    HCT 30.1 (L) 06/15/2023    MCV 78.0 (L) 06/15/2023     06/15/2023    HEPBSAG Non-Reactive 04/19/2023    AST 21 06/13/2023    URICACID 3.5 06/13/2023       Assessment  1. PPD# 2 after vaginal delivery  2.   PP anemia 2/2 acute blood loss.   Plan  1. Discharge to home  2. Follow up with [facility]  in 6 weeks  3. Prescriptions for Motrin, ferrous sulfate, and Colace prescribed and advised on weaning.  4. Advised no tampons, intercourse, or tub baths for 6 weeks.       This note has been electronically signed.    Zia Ogden CNM  08:33 EDT  Cassia 15, 2023

## 2023-06-15 NOTE — DISCHARGE SUMMARY
[facility]  Vaginal delivery discharge summary      Patient: Karin Beaulieu      MR#:[id number]  Admission  Diagnosis:    (normal spontaneous vaginal delivery)    Postpartum anemia     Discharge Diagnosis:  (normal spontaneous vaginal delivery)    Postpartum anemia        Date of Admission: 2023  Date of Discharge:  6/15/2023    Procedures:  Vaginal, Spontaneous     2023    5:20 AM      Service:  Obstetrics    Hospital Course:  Patient underwent vaginal delivery and remained in the hospital for 2 days.  During that time she remained afebrile and hemodynamically stable.  On the day of discharge, she was eating, ambulating and voiding without difficulty.  She is breastfeeding and supplementing as needed.     Labs:    Lab Results   Component Value Date    WBC 19.16 (H) 06/15/2023    HGB 9.7 (L) 06/15/2023    HCT 30.1 (L) 06/15/2023    MCV 78.0 (L) 06/15/2023     06/15/2023    URICACID 3.5 2023    AST 21 2023    ALT 9 2023     2023     Results from last 7 days   Lab Units 23  0238   ABO TYPING  O   RH TYPING  Positive   ANTIBODY SCREEN  Negative       Discharge Medications     Discharge Medications      New Medications      Instructions Start Date   docusate sodium 100 MG capsule   100 mg, Oral, 2 Times Daily PRN      ferrous sulfate 325 (65 FE) MG tablet   325 mg, Oral, 2 Times Daily With Meals      ibuprofen 600 MG tablet  Commonly known as: ADVIL,MOTRIN   600 mg, Oral, Every 6 Hours PRN         Continue These Medications      Instructions Start Date   Prenatal Vitamin 27-0.8 MG tablet   1 tablet, Oral, Daily         Stop These Medications    ondansetron ODT 4 MG disintegrating tablet  Commonly known as: ZOFRAN-ODT            Discharge Disposition:  To Home    Discharge Condition:  Stable. PP anemia taking ferrous sulfate.     Discharge Diet: Regular    Activity at Discharge: Pelvic rest    Follow-up Appointments  Follow up with LW in 6 weeks.      Zia Ogden CNM  06/15/23  08:33 EDT

## 2024-03-08 ENCOUNTER — OFFICE VISIT (OUTPATIENT)
Dept: FAMILY MEDICINE CLINIC | Facility: CLINIC | Age: 44
End: 2024-03-08
Payer: MEDICAID

## 2024-03-08 VITALS
HEART RATE: 89 BPM | SYSTOLIC BLOOD PRESSURE: 122 MMHG | DIASTOLIC BLOOD PRESSURE: 78 MMHG | BODY MASS INDEX: 25.49 KG/M2 | HEIGHT: 68 IN | OXYGEN SATURATION: 97 % | WEIGHT: 168.2 LBS

## 2024-03-08 DIAGNOSIS — Z00.00 ANNUAL PHYSICAL EXAM: Primary | ICD-10-CM

## 2024-03-08 DIAGNOSIS — Z76.89 ENCOUNTER TO ESTABLISH CARE: ICD-10-CM

## 2024-03-08 NOTE — ASSESSMENT & PLAN NOTE
Age appropriate screenings and recommendations reviewed  Pap UTD 2022- [facility] G[facility]ens  Deferred mammogram as pt currently breastfeeding, will plan for FU visit  Tdap 2017. UTD on MMR, Hep B.  Plan for annual COVID and Flu vaccinations  Labs reviewed from [facility] 2023, normal  Colon cancer screening at 45

## 2024-03-08 NOTE — PROGRESS NOTES
New Patient Office Visit      Date: 2024   Patient Name: Karin Beaulieu  : 1980   MRN: [id number]     Chief Complaint:    Chief Complaint   Patient presents with    Establish Care       History of Present Illness: Karin Beaulieu is a 44 y.o. female who is here today to establish care.      Subjective      HPI:  Pt presents today to [facility] with PCP.  Has not had primary care provider in several years. Has followed w OB/Gyn (Jose Givens) during her pregnancies.     No chronic medical conditions.  No previous surgeries    Has 6 children, ages 20 to 9 months.   Not currently menstruating as she is still breast feeding.   Had pap smear in - normal.     Review of Systems:   Denies headaches, chest pain, SOB, sleep disturbance, constipation, diarrhea. Otherwise negative/not pertinent unless otherwise noted above in HPI.     Past Medical History: History reviewed. No pertinent past medical history.    Past Surgical History: History reviewed. No pertinent surgical history.    Family History: History reviewed. No pertinent family history.    Social History:   Social History     Socioeconomic History    Marital status:    Tobacco Use    Smoking status: Never    Smokeless tobacco: Never   Vaping Use    Vaping status: Never Used   Substance and Sexual Activity    Alcohol use: Never    Drug use: Never    Sexual activity: Defer       Medications:     Current Outpatient Medications:     docusate sodium 100 MG capsule, Take 1 capsule by mouth 2 (Two) Times a Day As Needed for Constipation., Disp: 60 capsule, Rfl: 1    ferrous sulfate 325 (65 FE) MG tablet, Take 1 tablet by mouth 2 (Two) Times a Day With Meals., Disp: 60 tablet, Rfl: 1    ibuprofen (ADVIL,MOTRIN) 600 MG tablet, Take 1 tablet by mouth Every 6 (Six) Hours As Needed for Mild Pain, Disp: 60 tablet, Rfl: 1    Prenatal Vit-Fe Fumarate-FA (Prenatal Vitamin) 27-0.8 MG tablet, Take 1 tablet by mouth Daily., Disp: 100 tablet, Rfl:  "3    Allergies:   No Known Allergies    Immunizations:  Immunization History   Administered Date(s) Administered    Covid-19 (Pfizer) Gray Cap Monovalent 06/30/2022     Pap:  Up-To-Date, 2022 (Care Everywhere)    Hep C (Age 18-79 once):  previously negative  HIV (Age 15-65 once): previously negative  A1c: Normal 4/2024    Tobacco Use: Low Risk  (3/8/2024)    Patient History     Smoking Tobacco Use: Never     Smokeless Tobacco Use: Never     Passive Exposure: Not on file       Social History     Substance and Sexual Activity   Alcohol Use Never        Social History     Substance and Sexual Activity   Drug Use Never        Diet/Physical activity: Reports healthy. counseled on 03/08/24    PHQ-2 Depression Screening  Little interest or pleasure in doing things? 0-->not at all   Feeling down, depressed, or hopeless? 0-->not at all   PHQ-2 Total Score 0     PHQ-9 Total Score: 0     Objective     Physical Exam:  Vital Signs:   Vitals:    03/08/24 0930   BP: 122/78   BP Location: Left arm   Patient Position: Sitting   Cuff Size: Adult   Pulse: 89   SpO2: 97%   Weight: 76.3 kg (168 lb 3.2 oz)   Height: 172.7 cm (68\")     Body mass index is 25.57 kg/m².    Physical Exam  Constitutional:       Appearance: She is normal weight. She is not ill-appearing.   HENT:      Head: Normocephalic and atraumatic.      Right Ear: Tympanic membrane normal.      Left Ear: Tympanic membrane normal.      Mouth/Throat:      Mouth: Mucous membranes are moist.      Pharynx: Oropharynx is clear. No oropharyngeal exudate or posterior oropharyngeal erythema.   Eyes:      Extraocular Movements: Extraocular movements intact.      Conjunctiva/sclera: Conjunctivae normal.   Cardiovascular:      Rate and Rhythm: Normal rate and regular rhythm.      Heart sounds: Normal heart sounds.   Pulmonary:      Breath sounds: Normal breath sounds. No wheezing or rhonchi.   Abdominal:      General: Abdomen is flat.      Palpations: Abdomen is soft.      Tenderness: " There is no abdominal tenderness.   Musculoskeletal:         General: Normal range of motion.      Cervical back: Normal range of motion and neck supple.   Lymphadenopathy:      Cervical: No cervical adenopathy.   Neurological:      General: No focal deficit present.      Mental Status: She is alert.   Psychiatric:         Mood and Affect: Mood normal.         Thought Content: Thought content normal.       Labs:   Hemoglobin A1C   Date Value Ref Range Status   04/19/2023 5.50 4.80 - 5.60 % Final          Assessment / Plan      Assessment/Plan:   Diagnoses and all orders for this visit:    1. Annual physical exam (Primary)  Assessment & Plan:  Age appropriate screenings and recommendations reviewed  Pap UTD 2022- [facility] G[facility]ens  Deferred mammogram as pt currently breastfeeding, will plan for FU visit  Tdap 2017. UTD on MMR, Hep B.  Plan for annual COVID and Flu vaccinations  Labs reviewed from  2023, normal  Colon cancer screening at 47      2. Encounter to establish care      Healthcare Maintenance:  Counseling provided based on age appropriate USPSTF guidelines.  BMI is >= 25 and <30. (Overweight) The following options were offered after discussion;: information on healthy weight added to patient's after visit summary     Karin Beaulieu voices understanding and acceptance of this advice and will call back with any further questions or concerns. AVS with preventive healthcare tips printed for patient.         Follow Up:   Return in about 1 year (around 3/8/2025) for Annual.        Stephanie Wells DO  [facility] [place] [address]

## 2024-06-25 ENCOUNTER — TELEPHONE (OUTPATIENT)
Dept: FAMILY MEDICINE CLINIC | Facility: CLINIC | Age: 44
End: 2024-06-25
Payer: MEDICAID

## 2024-06-25 NOTE — TELEPHONE ENCOUNTER
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 PATIENT WAS NOT WORKING AND UNABLE TO PAY THE BILL. NEEDS A LETTER FROM THE PROVIDER TO HAVE WATER TURNED BACK ON.

## 2024-07-01 NOTE — TELEPHONE ENCOUNTER
Unable to leave message.     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 She has only been seen 1 time and does not have any chronic conditions.

## 2025-01-21 ENCOUNTER — APPOINTMENT (OUTPATIENT)
Dept: GENERAL RADIOLOGY | Facility: HOSPITAL | Age: 45
End: 2025-01-21
Payer: OTHER MISCELLANEOUS

## 2025-01-21 ENCOUNTER — HOSPITAL ENCOUNTER (EMERGENCY)
Facility: HOSPITAL | Age: 45
Discharge: HOME | End: 2025-01-22
Attending: EMERGENCY MEDICINE
Payer: OTHER MISCELLANEOUS

## 2025-01-21 VITALS
BODY MASS INDEX: 23.48 KG/M2 | HEART RATE: 81 BPM | DIASTOLIC BLOOD PRESSURE: 76 MMHG | HEIGHT: 69 IN | SYSTOLIC BLOOD PRESSURE: 117 MMHG | RESPIRATION RATE: 18 BRPM | OXYGEN SATURATION: 99 % | TEMPERATURE: 98.2 F | WEIGHT: 158.51 LBS

## 2025-01-21 DIAGNOSIS — M25.562 ACUTE PAIN OF LEFT KNEE: Primary | ICD-10-CM

## 2025-01-21 DIAGNOSIS — S80.02XA CONTUSION OF LEFT KNEE, INITIAL ENCOUNTER: ICD-10-CM
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
## 2025-01-21 LAB
AMPHET+METHAMPHET UR QL: NEGATIVE
AMPHETAMINES UR QL: NEGATIVE
BARBITURATES UR QL SCN: NEGATIVE
BENZODIAZ UR QL SCN: NEGATIVE
BUPRENORPHINE SERPL-MCNC: NEGATIVE NG/ML
CANNABINOIDS SERPL QL: NEGATIVE
COCAINE UR QL: NEGATIVE
METHADONE UR QL SCN: NEGATIVE
OPIATES UR QL: NEGATIVE
OXYCODONE UR QL SCN: NEGATIVE
PCP UR QL SCN: NEGATIVE
TRICYCLICS UR QL SCN: NEGATIVE

## 2025-01-21 PROCEDURE — 73560 X-RAY EXAM OF KNEE 1 OR 2: CPT

## 2025-01-21 PROCEDURE — 80307 DRUG TEST PRSMV CHEM ANLYZR: CPT | Performed by: EMERGENCY MEDICINE

## 2025-01-21 PROCEDURE — 99283 EMERGENCY DEPT VISIT LOW MDM: CPT

## 2025-01-21 RX ORDER — IBUPROFEN 800 MG/1
800 TABLET, FILM COATED ORAL ONCE
Status: COMPLETED | OUTPATIENT
Start: 2025-01-21 | End: 2025-01-22

## 2025-01-21 NOTE — Clinical Note
[facility] EMERGENCY DEPARTMENT  [address]  [facility] [address]  Phone: [phone]    Karin Beaulieu was seen and treated in our emergency department on 1/21/2025.  She may return to work on 01/24/2025.         Thank you for choosing [facility].    Desmond Parrish RN

## 2025-01-22 LAB — FENTANYL UR-MCNC: NEGATIVE NG/ML

## 2025-01-22 RX ADMIN — IBUPROFEN 800 MG: 800 TABLET, FILM COATED ORAL at 00:00

## 2025-01-22 NOTE — ED PROVIDER NOTES
EMERGENCY DEPARTMENT ENCOUNTER    Pt Name: Karin Beaulieu  MRN: [id number]  Pt :   1980  Room Number:  VR03/V3  Date of encounter:  2025  PCP: Stephanie Wells DO  ED Provider: MARIO Motley    Historian: Patient    HPI:  Chief Complaint: Left Knee pain following a fall at work.     Context: Kairn Beaulieu is a 45 y.o. female who presents to the ED c/o left knee pain following a fall at work.     Knee Pain  Location:  Knee  Injury: yes    Mechanism of injury: fall    Knee location:  L knee  Pain details:     Quality:  Aching    Radiates to:  Does not radiate    Severity:  Moderate    Onset quality:  Sudden    Timing:  Constant    Progression:  Unchanged  Chronicity:  New  Dislocation: no    Foreign body present:  No foreign bodies  Prior injury to area:  No  Relieved by:  Rest and immobilization  Worsened by:  Activity  Ineffective treatments:  None tried  Associated symptoms: swelling         REVIEW OF SYSTEMS  A chief complaint appropriate review of systems was completed and is negative except as noted in the HPI.     PAST MEDICAL HISTORY  No past medical history on file.    PAST SURGICAL HISTORY  No past surgical history on file.    FAMILY HISTORY  No family history on file.    SOCIAL HISTORY  Social History     Socioeconomic History    Marital status:    Tobacco Use    Smoking status: Never    Smokeless tobacco: Never   Vaping Use    Vaping status: Never Used   Substance and Sexual Activity    Alcohol use: Never    Drug use: Never    Sexual activity: Defer       ALLERGIES  Patient has no known allergies.    PHYSICAL EXAM  Physical Exam  Vitals and nursing note reviewed.   Constitutional:       General: She is not in acute distress.     Appearance: Normal appearance. She is not ill-appearing or toxic-appearing.   HENT:      Head: Normocephalic and atraumatic.      Nose: Nose normal.      Mouth/Throat:      Mouth: Mucous membranes are moist.   Eyes:      Extraocular Movements:  Extraocular movements intact.   Cardiovascular:      Rate and Rhythm: Normal rate.      Pulses: Normal pulses.   Pulmonary:      Effort: Pulmonary effort is normal.   Musculoskeletal:      Cervical back: Normal range of motion.      Left knee: Swelling present. No deformity, effusion or bony tenderness. Normal range of motion. Tenderness present. Normal alignment, normal meniscus and normal patellar mobility. Normal pulse.   Skin:     General: Skin is warm and dry.   Neurological:      General: No focal deficit present.      Mental Status: She is alert.   Psychiatric:         Mood and Affect: Mood normal.         Behavior: Behavior normal.       LAB RESULTS  Results for orders placed or performed during the hospital encounter of 01/21/25   Urine Drug Screen - Urine, Clean Catch    Collection Time: 01/21/25 11:26 PM    Specimen: Urine, Clean Catch   Result Value Ref Range    THC, Screen, Urine Negative Negative    Phencyclidine (PCP), Urine Negative Negative    Cocaine Screen, Urine Negative Negative    Methamphetamine, Ur Negative Negative    Opiate Screen Negative Negative    Amphetamine Screen, Urine Negative Negative    Benzodiazepine Screen, Urine Negative Negative    Tricyclic Antidepressants Screen Negative Negative    Methadone Screen, Urine Negative Negative    Barbiturates Screen, Urine Negative Negative    Oxycodone Screen, Urine Negative Negative    Buprenorphine, Screen, Urine Negative Negative   Fentanyl, Urine - Urine, Clean Catch    Collection Time: 01/21/25 11:26 PM    Specimen: Urine, Clean Catch   Result Value Ref Range    Fentanyl, Urine Negative Negative       If labs were ordered, I independently reviewed the results and considered them in treating the patient.    RADIOLOGY  XR Knee 1 or 2 View Left   Final Result   Impression:   No evidence of fracture or significant joint effusion         Electronically Signed: Shmuel Peralta     1/21/2025 10:01 PM EST     Workstation ID: [id number]        [x]  Radiologist's Report Reviewed:  I ordered and independently interpreted the above noted radiographic studies.  See radiologist's dictation for official interpretation.      PROCEDURES    Procedures    No orders to display       MEDICATIONS GIVEN IN ER    Medications   ibuprofen (ADVIL,MOTRIN) tablet 800 mg (800 mg Oral Given 1/22/25 0000)       MEDICAL DECISION MAKING, PROGRESS, and CONSULTS   Medical Decision Making  Patient presents to the ED with complains of left knee pain following a fall. Able to flex and extend although somewhat limited by pain. Considered, but doubt, tibial plateau fracture, septic arthritis, other acute unstable fracture, or significant neurovascular compromise. Imaging of affected knee demonstrates no acute abnormalities. Patient provided UDS for her employer was negative. Treated symptomatically in the ED with Ibuprofen and recommendation made for continued symptomatic care outpatient with rest, ice, Ibuprofen/Tylenol and continued outpatient follow up. Return precautions provided.       Problems Addressed:  Acute pain of left knee: complicated acute illness or injury  Contusion of left knee, initial encounter: complicated acute illness or injury  Fall, initial encounter: complicated acute illness or injury    Amount and/or Complexity of Data Reviewed  Radiology: ordered and independent interpretation performed. Decision-making details documented in ED Course.    Risk  Prescription drug management.    Discussion below represents my analysis of pertinent findings related to patient's condition, differential diagnosis, treatment plan and final disposition.    Differential diagnosis: Contusion, arthritis, fracture, dislocation, tendon/ligamentous injury.      Additional sources  Discussed/ obtained information from independent historians:   [] Spouse  [] Parent  [] Family member  [] Friend  [] EMS   [] Other:  External (non-ED) record review:   [] Inpatient record:   [] Office record:   []  Outpatient record:   [] Prior Outpatient labs:   [] Prior Outpatient radiology:   [] Primary Care record:   [] Outside ED record:   [] Other:   Patient's care impacted by:   [] Diabetes  [] Hypertension  [] Hyperlipidemia  [] Hypothyroidism   [] Coronary Artery Disease  [] Congestive Heart Failure   [] COPD   [] Cancer   [] Obesity  [] GERD   [] Tobacco Abuse   [] Substance Abuse    [] Anxiety   [] Depression   [] Other:   Care significantly affected by Social Determinants of Health (housing and economic circumstances, unemployment)    [] Yes     [x] No   If yes, Patient's care significantly limited by  Social Determinants of Health including:   [] Inadequate housing   [] Low income   [] Alcoholism and drug addiction in family   [] Problems related to primary support group   [] Unemployment   [] Problems related to employment   [] Other Social Determinants of Health:     Orders placed during this visit:  Orders Placed This Encounter   Procedures    XR Knee 1 or 2 View Left    Urine Drug Screen - Urine, Clean Catch    Fentanyl, Urine - Urine, Clean Catch   I considered prescription management  with:   [x] Pain medication: The use of prescription pain medication was considered in this patient however not implemented as risks outweigh benefits of prescription pain management and it was felt patient's symptoms could be managed with OTC anti-inflammatory medications and Tylenol. Will defer prescribed pain medication to primary care.    [] Antiviral  [] Antibiotic   [] Other:   Rationale:  ED Course:    ED Course as of 01/22/25 0230 Tue Jan 21, 2025 2136 Vitals and Telemetry tracing was reviewed and directly interpreted by myself demonstrating blood pressure 117/76, temperature 98.8 °F, heart rate of 81, respirations 18 breaths/min and oxygen saturation 99% on room air [JG]   2137 BP: 117/76 [JG]   2137 Temp: 98.2 °F (36.8 °C) [JG]   2137 Heart Rate: 81 [JG]   2137 Resp: 18 [JG]   2137 SpO2: 99 % [JG]   2239 XR left  knee personally interpreted by myself and with official read provided by radiology demonstrates  No evidence of fracture or significant joint effusion [JG]      ED Course User Index  [JG] Jose Manuel Novoa PA          DIAGNOSIS  Final diagnoses:   Acute pain of left knee   Contusion of left knee, initial encounter   Fall, initial encounter     DISPOSITION    ED Disposition       ED Disposition   Discharge    Condition   Stable    Comment   --             Please note that portions of this document were completed with voice recognition software.        Jose Manuel Novoa PA  01/22/25 0239

## 2025-01-22 NOTE — DISCHARGE INSTRUCTIONS
Symptomatic care is recommended with Tylenol and/or ibuprofen as needed for pain. Take all medications as prescribed and instructed. Follow up with primary care as directed or return to Emergency Department with worsening of symptoms.